# Patient Record
(demographics unavailable — no encounter records)

---

## 2024-11-04 NOTE — DISCUSSION/SUMMARY
[FreeTextEntry1] : 15 year old male presenting with injury to left 5th finger  -Joint fracture vs dislocation; ice applied for swelling -ibuprofen 400mg tab, x1, PO, NOW for pain -VSS, patient alert and oriented during visit -Parent contacted and en route; parent to come to the school to travel with EMS to hospital -Patient monitored by KIA Alvarado -School dot/security aware -Patient left health center with mother and EMS; stable

## 2024-11-04 NOTE — HISTORY OF PRESENT ILLNESS
[de-identified] : broken finger [FreeTextEntry6] : 15-year-old male presenting with injury to left hand 5th finger  Patient reports while in gym glass they were outside playing football; reports the ball was thrown to him and while catching it the ball hit his finger. Patient reports initially he did not feel pain but when he looked at his finger, he noticed something was wrong.   Patient denies any popping or cracking sensation. Denies any numbness or tingling of the hand or finger Patient reports pain 7/10

## 2024-11-04 NOTE — HISTORY OF PRESENT ILLNESS
[de-identified] : broken finger [FreeTextEntry6] : 15-year-old male presenting with injury to left hand 5th finger  Patient reports while in gym glass they were outside playing football; reports the ball was thrown to him and while catching it the ball hit his finger. Patient reports initially he did not feel pain but when he looked at his finger, he noticed something was wrong.   Patient denies any popping or cracking sensation. Denies any numbness or tingling of the hand or finger Patient reports pain 7/10

## 2024-11-04 NOTE — REVIEW OF SYSTEMS
[Restriction of Motion] : restriction of motion [Swelling of Joint] : swelling of joint [Redness of Joint] : redness of joint [Bone Deformity] : bone deformity

## 2024-11-04 NOTE — PHYSICAL EXAM
[NL] : no acute distress, alert [Moves All Extremities x 4] : does not move all extremities x4 [Warm, Well Perfused x4] : warm, well perfused x4 [Capillary Refill <2s] : capillary refill < 2s [de-identified] : + Swelling of left-hand 5th finger proximal phalange; mild erythema. Patient denies tenderness on palpation; patient with mobilization of finger at the base of the proximal finger; unable to flex finger.

## 2024-11-04 NOTE — PHYSICAL EXAM
[NL] : no acute distress, alert [Moves All Extremities x 4] : does not move all extremities x4 [Warm, Well Perfused x4] : warm, well perfused x4 [Capillary Refill <2s] : capillary refill < 2s [de-identified] : + Swelling of left-hand 5th finger proximal phalange; mild erythema. Patient denies tenderness on palpation; patient with mobilization of finger at the base of the proximal finger; unable to flex finger.

## 2024-11-04 NOTE — PHYSICAL EXAM
[NL] : no acute distress, alert [Moves All Extremities x 4] : does not move all extremities x4 [Warm, Well Perfused x4] : warm, well perfused x4 [Capillary Refill <2s] : capillary refill < 2s [de-identified] : + Swelling of left-hand 5th finger proximal phalange; mild erythema. Patient denies tenderness on palpation; patient with mobilization of finger at the base of the proximal finger; unable to flex finger.

## 2024-11-04 NOTE — HISTORY OF PRESENT ILLNESS
[de-identified] : broken finger [FreeTextEntry6] : 15-year-old male presenting with injury to left hand 5th finger  Patient reports while in gym glass they were outside playing football; reports the ball was thrown to him and while catching it the ball hit his finger. Patient reports initially he did not feel pain but when he looked at his finger, he noticed something was wrong.   Patient denies any popping or cracking sensation. Denies any numbness or tingling of the hand or finger Patient reports pain 7/10

## 2024-11-29 NOTE — HISTORY OF PRESENT ILLNESS
[Up to date] : Up to date [Calcium source] : calcium source [Uses safety belts/safety equipment] : uses safety belts/safety equipment  [No] : Patient has not had sexual intercourse [Uses tobacco] : does not use tobacco [de-identified] : Last dental visit: more than 1 year ago, brushes 1 time per day  [de-identified] : Attends [employer]; favorite class: Physical Education  [de-identified] : tried marijuana one time one year go  [de-identified] : no access to guns; has a working smoke detector  [Needs Immunizations] : Needs immunizations [Grade: ____] : Grade: [unfilled] [Normal Performance] : normal performance [Eats regular meals including adequate fruits and vegetables] : eats regular meals including adequate fruits and vegetables [Drinks non-sweetened liquids] : drinks non-sweetened liquids  [Has friends] : has friends [Uses electronic nicotine delivery system] : uses electronic nicotine delivery system [Uses drugs] : uses drugs  [Drinks alcohol] : drinks alcohol [Yes] : Patient has had sexual intercourse. [With Teen] : teen [Sleep Concerns] : no sleep concerns [Has concerns about body or appearance] : does not have concerns about body or appearance [Gets depressed, anxious, or irritable/has mood swings] : does not get depressed, anxious, or irritable/has mood swings [Has thought about hurting self or considered suicide] : has not thought about hurting self or considered suicide [de-identified] : none  [de-identified] : last dental appointment: this week; brushes teeth 2 times per day  [de-identified] : due for flu and meningitis vaccines [de-identified] : Lives with mother, sister, brother, cat - feels safe at home; Sleeps from 11 pm - 6 am  [de-identified] : Drinks water, milk soda, juice  [de-identified] : for fun: sports  [de-identified] : attracted to girls  [FreeTextEntry1] : 15 year old male presenting for complete physical examination for working papers.   Pt denies history of asthma,,COVID-19, kidney problems, or seizures. Pt denies chest pain, difficulty breathing, or chest palpitations with exercise. Pt is able to keep up with his peers when he plays sports.   Pt recently had dental extractions and is taking Ibuprofen for pain. Pt had a concussion around at age 10, recovery took 1 day. Pt visited ED and had an x-ray, no admission. Pt has a history of right foot fracture when younger, fully healed, no residual issues.  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 EMS was called and pt was evaluated in emergency department and given EpiPen.   Screening Questions: 1. Chest pain, discomfort, tightness, or pressure related to exertion: N 2. Unexplained syncope or near-syncope not felt to be vasovagal or neurocardiogenic in origin:  N 3. Excessive and unexplained dyspnea or fatigue or palpitations associated with exercise:  N 4. Previous recognition of a heart murmur:  N 5. Elevated systemic blood pressure:  N 6. Previous restriction from participation in sports:  N 7. Previous testing for the heart, ordered by a health care provider:  N 8. Family history of premature death (sudden and unexpected or otherwise) before 50 y of age attributable to heart disease in 1st degree relative:  N/Y/Unsure 9. Disability from heart disease in close relative <50 y of age:  N 10. Hypertrophic or dilated cardiomyopathy, LQTS, or other ion channelopathies, Marfan syndrome, or clinically significant arrhythmias; specific knowledge of genetic cardiac conditions in family members:  N  Pt reports weight gain during the pandemic (weight max: 150-180 lbs) and has now been eating healthier & participating in sports and lost weight over the past 2 years. Pt eats 2-3 meals per day. Pt wants to gain more muscle. Pt does not count calories or use laxatives.

## 2024-11-29 NOTE — HISTORY OF PRESENT ILLNESS
[Up to date] : Up to date [Calcium source] : calcium source [Uses safety belts/safety equipment] : uses safety belts/safety equipment  [No] : Patient has not had sexual intercourse [Uses tobacco] : does not use tobacco [de-identified] : Last dental visit: more than 1 year ago, brushes 1 time per day  [de-identified] : Attends [employer]; favorite class: Physical Education  [de-identified] : tried marijuana one time one year go  [de-identified] : no access to guns; has a working smoke detector  [Needs Immunizations] : Needs immunizations [Grade: ____] : Grade: [unfilled] [Normal Performance] : normal performance [Eats regular meals including adequate fruits and vegetables] : eats regular meals including adequate fruits and vegetables [Drinks non-sweetened liquids] : drinks non-sweetened liquids  [Has friends] : has friends [Uses electronic nicotine delivery system] : uses electronic nicotine delivery system [Uses drugs] : uses drugs  [Drinks alcohol] : drinks alcohol [Yes] : Patient has had sexual intercourse. [With Teen] : teen [Sleep Concerns] : no sleep concerns [Has concerns about body or appearance] : does not have concerns about body or appearance [Gets depressed, anxious, or irritable/has mood swings] : does not get depressed, anxious, or irritable/has mood swings [Has thought about hurting self or considered suicide] : has not thought about hurting self or considered suicide [de-identified] : none  [de-identified] : last dental appointment: this week; brushes teeth 2 times per day  [de-identified] : due for flu and meningitis vaccines [de-identified] : Lives with mother, sister, brother, cat - feels safe at home; Sleeps from 11 pm - 6 am  [de-identified] : Drinks water, milk soda, juice  [de-identified] : for fun: sports  [de-identified] : attracted to girls  [FreeTextEntry1] : 15 year old male presenting for complete physical examination for working papers.   Pt denies history of asthma,,COVID-19, kidney problems, or seizures. Pt denies chest pain, difficulty breathing, or chest palpitations with exercise. Pt is able to keep up with his peers when he plays sports.   Pt recently had dental extractions and is taking Ibuprofen for pain. Pt had a concussion around at age 10, recovery took 1 day. Pt visited ED and had an x-ray, no admission. Pt has a history of right foot fracture when younger, fully healed, no residual issues.  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 EMS was called and pt was evaluated in emergency department and given EpiPen.   Screening Questions: 1. Chest pain, discomfort, tightness, or pressure related to exertion: N 2. Unexplained syncope or near-syncope not felt to be vasovagal or neurocardiogenic in origin:  N 3. Excessive and unexplained dyspnea or fatigue or palpitations associated with exercise:  N 4. Previous recognition of a heart murmur:  N 5. Elevated systemic blood pressure:  N 6. Previous restriction from participation in sports:  N 7. Previous testing for the heart, ordered by a health care provider:  N 8. Family history of premature death (sudden and unexpected or otherwise) before 50 y of age attributable to heart disease in 1st degree relative:  N/Y/Unsure 9. Disability from heart disease in close relative <50 y of age:  N 10. Hypertrophic or dilated cardiomyopathy, LQTS, or other ion channelopathies, Marfan syndrome, or clinically significant arrhythmias; specific knowledge of genetic cardiac conditions in family members:  N  Pt reports weight gain during the pandemic (weight max: 150-180 lbs) and has now been eating healthier & participating in sports and lost weight over the past 2 years. Pt eats 2-3 meals per day. Pt wants to gain more muscle. Pt does not count calories or use laxatives.

## 2024-11-29 NOTE — PHYSICAL EXAM
[Alert] : alert [No Acute Distress] : no acute distress [Normocephalic] : normocephalic [Atraumatic] : atraumatic [EOMI Bilateral] : EOMI bilateral [PERRLA] : APOLONIA [Conjunctivae with no discharge] : conjunctivae with no discharge [No Excess Tearing] : no excess tearing [Clear tympanic membranes with bony landmarks and light reflex present bilaterally] : clear tympanic membranes with bony landmarks and light reflex present bilaterally  [Auditory Canals Clear] : auditory canals clear [Pink Nasal Mucosa] : pink nasal mucosa [Nonerythematous Oropharynx] : nonerythematous oropharynx [Supple, full passive range of motion] : supple, full passive range of motion [No Palpable Masses] : no palpable masses [Clear to Auscultation Bilaterally] : clear to auscultation bilaterally [Regular Rate and Rhythm] : regular rate and rhythm [Normal S1, S2 audible] : normal S1, S2 audible [No Murmurs] : no murmurs [Soft] : soft [NonTender] : non tender [Non Distended] : non distended [Normoactive Bowel Sounds] : normoactive bowel sounds [No Hepatomegaly] : no hepatomegaly [No Splenomegaly] : no splenomegaly [No Abnormal Lymph Nodes Palpated] : no abnormal lymph nodes palpated [Normal Muscle Tone] : normal muscle tone [No Gait Asymmetry] : no gait asymmetry [No pain or deformities with palpation of bone, muscles, joints] : no pain or deformities with palpation of bone, muscles, joints [Moves all extremities x 4] : moves all extremities x4 [Symmetric Hip Rotation] : symmetric hip rotation [Straight] : straight [No Scoliosis] : no scoliosis [+2 Patella DTR] : +2 patella DTR [Cranial Nerves Grossly Intact] : cranial nerves grossly intact [No Rash or Lesions] : no rash or lesions [de-identified] : + swelling of the upper palate from extractions  [FreeTextEntry6] : not done  [de-identified] : able to duck walk, toe walk, heel walk, single leg hop

## 2024-11-29 NOTE — DISCUSSION/SUMMARY
[FreeTextEntry1] : 16-year-old male presenting for complete physical examination.   1) Complete Physical Examination  -Well adolescent.  -Working papers clearance given.  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 Pt understand that if he wants sports clearance he needs a  exam.  -Needs flu and meningitis vaccinations. VIS and consent given  -Anemia screening done - Hgb ordered.  -Counseled regarding dental hygiene, seatbelt safety, Healthy Lifestyle 5210, and healthy relationships. -Routine dental and vision care recommended. -Health insurance assessed: insured -Annual visit summary sent home.   2) STI &Y HIV Testing  -Ordered urine GC/CT.  -Ordered HIV screen.  -Encouraged consistent condom use.   Note: Return to health center on 12/2/24 for vision screening, labs, firearm screening, and discussion of substance use.

## 2024-11-29 NOTE — HISTORY OF PRESENT ILLNESS
[Up to date] : Up to date [Calcium source] : calcium source [Uses safety belts/safety equipment] : uses safety belts/safety equipment  [No] : Patient has not had sexual intercourse [Uses tobacco] : does not use tobacco [de-identified] : Last dental visit: more than 1 year ago, brushes 1 time per day  [de-identified] : Attends [employer]; favorite class: Physical Education  [de-identified] : tried marijuana one time one year go  [de-identified] : no access to guns; has a working smoke detector  [Needs Immunizations] : Needs immunizations [Grade: ____] : Grade: [unfilled] [Normal Performance] : normal performance [Eats regular meals including adequate fruits and vegetables] : eats regular meals including adequate fruits and vegetables [Drinks non-sweetened liquids] : drinks non-sweetened liquids  [Has friends] : has friends [Uses electronic nicotine delivery system] : uses electronic nicotine delivery system [Uses drugs] : uses drugs  [Drinks alcohol] : drinks alcohol [Yes] : Patient has had sexual intercourse. [With Teen] : teen [Sleep Concerns] : no sleep concerns [Has concerns about body or appearance] : does not have concerns about body or appearance [Gets depressed, anxious, or irritable/has mood swings] : does not get depressed, anxious, or irritable/has mood swings [Has thought about hurting self or considered suicide] : has not thought about hurting self or considered suicide [de-identified] : none  [de-identified] : last dental appointment: this week; brushes teeth 2 times per day  [de-identified] : due for flu and meningitis vaccines [de-identified] : Lives with mother, sister, brother, cat - feels safe at home; Sleeps from 11 pm - 6 am  [de-identified] : Drinks water, milk soda, juice  [de-identified] : for fun: sports  [de-identified] : attracted to girls  [FreeTextEntry1] : 15 year old male presenting for complete physical examination for working papers.   Pt denies history of asthma,,COVID-19, kidney problems, or seizures. Pt denies chest pain, difficulty breathing, or chest palpitations with exercise. Pt is able to keep up with his peers when he plays sports.   Pt recently had dental extractions and is taking Ibuprofen for pain. Pt had a concussion around at age 10, recovery took 1 day. Pt visited ED and had an x-ray, no admission. Pt has a history of right foot fracture when younger, fully healed, no residual issues.  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 EMS was called and pt was evaluated in emergency department and given EpiPen.   Screening Questions: 1. Chest pain, discomfort, tightness, or pressure related to exertion: N 2. Unexplained syncope or near-syncope not felt to be vasovagal or neurocardiogenic in origin:  N 3. Excessive and unexplained dyspnea or fatigue or palpitations associated with exercise:  N 4. Previous recognition of a heart murmur:  N 5. Elevated systemic blood pressure:  N 6. Previous restriction from participation in sports:  N 7. Previous testing for the heart, ordered by a health care provider:  N 8. Family history of premature death (sudden and unexpected or otherwise) before 50 y of age attributable to heart disease in 1st degree relative:  N/Y/Unsure 9. Disability from heart disease in close relative <50 y of age:  N 10. Hypertrophic or dilated cardiomyopathy, LQTS, or other ion channelopathies, Marfan syndrome, or clinically significant arrhythmias; specific knowledge of genetic cardiac conditions in family members:  N  Pt reports weight gain during the pandemic (weight max: 150-180 lbs) and has now been eating healthier & participating in sports and lost weight over the past 2 years. Pt eats 2-3 meals per day. Pt wants to gain more muscle. Pt does not count calories or use laxatives.

## 2024-11-29 NOTE — PHYSICAL EXAM
[Alert] : alert [No Acute Distress] : no acute distress [Normocephalic] : normocephalic [Atraumatic] : atraumatic [EOMI Bilateral] : EOMI bilateral [PERRLA] : APOLONIA [Conjunctivae with no discharge] : conjunctivae with no discharge [No Excess Tearing] : no excess tearing [Clear tympanic membranes with bony landmarks and light reflex present bilaterally] : clear tympanic membranes with bony landmarks and light reflex present bilaterally  [Auditory Canals Clear] : auditory canals clear [Pink Nasal Mucosa] : pink nasal mucosa [Nonerythematous Oropharynx] : nonerythematous oropharynx [Supple, full passive range of motion] : supple, full passive range of motion [No Palpable Masses] : no palpable masses [Clear to Auscultation Bilaterally] : clear to auscultation bilaterally [Regular Rate and Rhythm] : regular rate and rhythm [Normal S1, S2 audible] : normal S1, S2 audible [No Murmurs] : no murmurs [Soft] : soft [NonTender] : non tender [Non Distended] : non distended [Normoactive Bowel Sounds] : normoactive bowel sounds [No Hepatomegaly] : no hepatomegaly [No Splenomegaly] : no splenomegaly [No Abnormal Lymph Nodes Palpated] : no abnormal lymph nodes palpated [Normal Muscle Tone] : normal muscle tone [No Gait Asymmetry] : no gait asymmetry [No pain or deformities with palpation of bone, muscles, joints] : no pain or deformities with palpation of bone, muscles, joints [Moves all extremities x 4] : moves all extremities x4 [Symmetric Hip Rotation] : symmetric hip rotation [Straight] : straight [No Scoliosis] : no scoliosis [+2 Patella DTR] : +2 patella DTR [Cranial Nerves Grossly Intact] : cranial nerves grossly intact [No Rash or Lesions] : no rash or lesions [de-identified] : + swelling of the upper palate from extractions  [FreeTextEntry6] : not done  [de-identified] : able to duck walk, toe walk, heel walk, single leg hop

## 2024-11-29 NOTE — PHYSICAL EXAM
[Alert] : alert [No Acute Distress] : no acute distress [Normocephalic] : normocephalic [Atraumatic] : atraumatic [EOMI Bilateral] : EOMI bilateral [PERRLA] : APOLONIA [Conjunctivae with no discharge] : conjunctivae with no discharge [No Excess Tearing] : no excess tearing [Clear tympanic membranes with bony landmarks and light reflex present bilaterally] : clear tympanic membranes with bony landmarks and light reflex present bilaterally  [Auditory Canals Clear] : auditory canals clear [Pink Nasal Mucosa] : pink nasal mucosa [Nonerythematous Oropharynx] : nonerythematous oropharynx [Supple, full passive range of motion] : supple, full passive range of motion [No Palpable Masses] : no palpable masses [Clear to Auscultation Bilaterally] : clear to auscultation bilaterally [Regular Rate and Rhythm] : regular rate and rhythm [Normal S1, S2 audible] : normal S1, S2 audible [No Murmurs] : no murmurs [Soft] : soft [NonTender] : non tender [Non Distended] : non distended [Normoactive Bowel Sounds] : normoactive bowel sounds [No Hepatomegaly] : no hepatomegaly [No Splenomegaly] : no splenomegaly [No Abnormal Lymph Nodes Palpated] : no abnormal lymph nodes palpated [Normal Muscle Tone] : normal muscle tone [No Gait Asymmetry] : no gait asymmetry [No pain or deformities with palpation of bone, muscles, joints] : no pain or deformities with palpation of bone, muscles, joints [Moves all extremities x 4] : moves all extremities x4 [Symmetric Hip Rotation] : symmetric hip rotation [Straight] : straight [No Scoliosis] : no scoliosis [+2 Patella DTR] : +2 patella DTR [Cranial Nerves Grossly Intact] : cranial nerves grossly intact [No Rash or Lesions] : no rash or lesions [de-identified] : + swelling of the upper palate from extractions  [FreeTextEntry6] : not done  [de-identified] : able to duck walk, toe walk, heel walk, single leg hop

## 2024-12-02 NOTE — DISCUSSION/SUMMARY
[FreeTextEntry1] : 15 y/o male presenting to [facility] for follow-up after his CPE on 11/27/24.  Needs vision screening, labs, firearm screening, discussion of substance use, and vaccines.  Plan - Vision screening performed today - 20/25 in each eye without glasses. - Labs performed today: GC/CT, HIV, H&H anemia screening. - Firearm screening: negative. - Substance use: has decreased MJ use overall, praised on progress made. - Vaccines: flu and MenACWY vaccines administered today without incident, pt tolerated injections well. - RTC in 2 days for lab results.

## 2024-12-02 NOTE — HISTORY OF PRESENT ILLNESS
[de-identified] : vision screening, labs, firearm screening, discussion of substance use, vaccines [FreeTextEntry6] : 15 y/o male presenting to [facility] for follow-up after his CPE on 11/27/24.  Needs vision screening, labs, firearm screening, discussion of substance use, and vaccines.  Firearm screening: pt denies having access to guns/weapons in his home or elsewhere.  Marijuana use: reports smoking approximately 1x/month with friends.  Never drives after smoking or gets in the car with anyone who has been smoking.  Reports smoking by himself sometimes.  Has never regretted anything he has done while smoking.  Has never gotten into trouble from smoking.  Was smoking more last year and cut down after being told to by his mother.    Vaccines: due for flu vaccine and MenACWY booster today.  Consent signed by mother on file.  No hx of adverse reactions to any vaccines in the past.